# Patient Record
(demographics unavailable — no encounter records)

---

## 2025-05-20 NOTE — ASSESSMENT
[FreeTextEntry1] :  The patient is being seen today for bl hand pain and numbness. It is present most of the time, especially if he is using his hands. It is worse on the right than the left. At the end of the day, he has a lot of pain around his wrist. He has a brace that he wears at night on the right, and he experiences throbbing pain and numbness in both hands after a few minutes in bed. He still has numbness during the night although it is much better with the brace. His numbness is in the first two fingers of the hands mainly. He stretches his wrists with benefit.   BL hand:  Tender volar wrist  Good finger ROM  -Tinels  -Phalens  +Compression test normal sensation median nerve distribution -thenar atrophy  The patient was advised of the diagnosis.  The natural history of the pathology was explained in full to the patient in layman's terms. We discussed the nature of the nerve as an electrical cable and what happens to the nerve in carpal tunnel syndrome.  We discussed that treatment for night symptoms included night bracing.  We discussed the possibility of injection when symptoms were intermittent or in patients who were unwilling to undergo surgery with constant symptoms.  We discussed that injection is a diagnostic and therapeutic aide and what this means.  We discussed the use of nerve testing in cases when diagnosis was in doubt or for confirmation to exclude alternate pathology.  We discussed that if symptoms were 24/7 surgery was recommended to give the nerve the best chance to recover but that once symptoms were constant, the nerve may not recover even with surgery.  We discussed that if left alone the nerve progression could worsen and that treatment was indicated to prevent progression of nerve compression.  The longer the nerve is left, the more likely to cause worsening irreversible damage.  All questions were answered.  The risks and benefits of surgical and non-surgical treatment alternatives were explained in full to the patient. I provided a left wrist cock-up for him to wear at night. He will have an EMG done to evaluate the severity of the carpal tunnel syndrome. He will continue nighttime bracing on the right and will start on the left.  We discussed pursuing positional modifications to relieve pressure on the nerve. He will follow up after the EMG.